# Patient Record
Sex: FEMALE | Race: WHITE | Employment: FULL TIME | URBAN - METROPOLITAN AREA
[De-identification: names, ages, dates, MRNs, and addresses within clinical notes are randomized per-mention and may not be internally consistent; named-entity substitution may affect disease eponyms.]

---

## 2020-09-23 ENCOUNTER — EMPLOYEE WELLNESS (OUTPATIENT)
Dept: FAMILY MEDICINE CLINIC | Age: 20
End: 2020-09-23

## 2020-09-23 LAB
CHOLEST SERPL-MCNC: 223 MG/DL
GLUCOSE SERPL-MCNC: 74 MG/DL (ref 65–100)
HDLC SERPL-MCNC: 55 MG/DL
LDLC SERPL CALC-MCNC: 125.8 MG/DL (ref 0–100)
TRIGL SERPL-MCNC: 211 MG/DL (ref ?–150)

## 2020-10-05 ENCOUNTER — VIRTUAL VISIT (OUTPATIENT)
Dept: SLEEP MEDICINE | Age: 20
End: 2020-10-05
Payer: COMMERCIAL

## 2020-10-05 DIAGNOSIS — G47.33 OBSTRUCTIVE SLEEP APNEA (ADULT) (PEDIATRIC): Primary | ICD-10-CM

## 2020-10-05 DIAGNOSIS — G47.26 SHIFT WORK SLEEP DISORDER: ICD-10-CM

## 2020-10-05 PROCEDURE — 99204 OFFICE O/P NEW MOD 45 MIN: CPT | Performed by: INTERNAL MEDICINE

## 2020-10-05 NOTE — PATIENT INSTRUCTIONS
[address] [place]. [phone] [facility], [address] Tel.  [phone] Fax. 100 [facility] 60 [place], [address] Tel.  [phone] Fax. [phone] [address] Fransisco Salazar Tel.  [phone] Fax. [phone] Sleep Apnea: After Your Visit Your Care Instructions Sleep apnea occurs when you frequently stop breathing for 10 seconds or longer during sleep. It can be mild to severe, based on the number of times per hour that you stop breathing or have slowed breathing. Blocked or narrowed airways in your nose, mouth, or throat can cause sleep apnea. Your airway can become blocked when your throat muscles and tongue relax during sleep. Sleep apnea is common, occurring in 1 out of 20 individuals. Individuals having any of the following characteristics should be evaluated and treated right away due to high risk and detrimental consequences from untreated sleep apnea: 
1. Obesity 2. Congestive Heart failure 3. Atrial Fibrillation 4. Uncontrolled Hypertension 5. Type II Diabetes 6. Night-time Arrhythmias 7. Stroke 8. Pulmonary Hypertension 9. High-risk Driving Populations (pilots, truck drivers, etc.) 10. Patients Considering Weight-loss Surgery How do you know you have sleep apnea? You probably have sleep apnea if you answer 'yes' to 3 or more of the following questions: S - Have you been told that you Snore? T - Are you often Tired during the day? O - Has anyone Observed you stop breathing while sleeping? P- Do you have (or are being treated for) high blood Pressure? B - Are you obese (Body Mass Index > 35)? A - Is your Age 48years old or older? N - Is your Neck size greater than 16 inches? G - Are you male Gender? A sleep physician can prescribe a breathing device that prevents tissues in the throat from blocking your airway.  Or your doctor may recommend using a dental device (oral breathing device) to help keep your airway open. In some cases, surgery may be needed to remove enlarged tissues in the throat. Follow-up care is a key part of your treatment and safety. Be sure to make and go to all appointments, and call your doctor if you are having problems. It's also a good idea to know your test results and keep a list of the medicines you take. How can you care for yourself at home? · Lose weight, if needed. It may reduce the number of times you stop breathing or have slowed breathing. · Go to bed at the same time every night. · Sleep on your side. It may stop mild apnea. If you tend to roll onto your back, sew a pocket in the back of your pajama top. Put a tennis ball into the pocket, and stitch the pocket shut. This will help keep you from sleeping on your back. · Avoid alcohol and medicines such as sleeping pills and sedatives before bed. · Do not smoke. Smoking can make sleep apnea worse. If you need help quitting, talk to your doctor about stop-smoking programs and medicines. These can increase your chances of quitting for good. · Prop up the head of your bed 4 to 6 inches by putting bricks under the legs of the bed. · Treat breathing problems, such as a stuffy nose, caused by a cold or allergies. · Use a continuous positive airway pressure (CPAP) breathing machine if lifestyle changes do not help your apnea and your doctor recommends it. The machine keeps your airway from closing when you sleep. · If CPAP does not help you, ask your doctor whether you should try other breathing machines. A bilevel positive airway pressure machine has two types of air pressureâone for breathing in and one for breathing out. Another device raises or lowers air pressure as needed while you breathe. · If your nose feels dry or bleeds when using one of these machines, talk with your doctor about increasing moisture in the air. A humidifier may help.  
· If your nose is runny or stuffy from using a breathing machine, talk with your doctor about using decongestants or a corticosteroid nasal spray. When should you call for help? Watch closely for changes in your health, and be sure to contact your doctor if: 
· You still have sleep apnea even though you have made lifestyle changes. · You are thinking of trying a device such as CPAP. · You are having problems using a CPAP or similar machine. Where can you learn more? Go to [facility]. Enter W302 in the search box to learn more about \"Sleep Apnea: After Your Visit. \"  
© 4810-5356 Healthwise, Incorporated. Care instructions adapted under license by [facility] (which disclaims liability or warranty for this information). This care instruction is for use with your licensed healthcare professional. If you have questions about a medical condition or this instruction, always ask your healthcare professional. Marilyn Holm any warranty or liability for your use of this information. PROPER SLEEP HYGIENE What to avoid · Do not have drinks with caffeine, such as coffee or black tea, for 8 hours before bed. · Do not smoke or use other types of tobacco near bedtime. Nicotine is a stimulant and can keep you awake. · Avoid drinking alcohol late in the evening, because it can cause you to wake in the middle of the night. · Do not eat a big meal close to bedtime. If you are hungry, eat a light snack. · Do not drink a lot of water close to bedtime, because the need to urinate may wake you up during the night. · Do not read or watch TV in bed. Use the bed only for sleeping and sexual activity. What to try · Go to bed at the same time every night, and wake up at the same time every morning. Do not take naps during the day. · Keep your bedroom quiet, dark, and cool. · Get regular exercise, but not within 3 to 4 hours of your bedtime. Pierre Hendrickson · Sleep on a comfortable pillow and mattress. · If watching the clock makes you anxious, turn it facing away from you so you cannot see the time. · If you worry when you lie down, start a worry book. Well before bedtime, write down your worries, and then set the book and your concerns aside. · Try meditation or other relaxation techniques before you go to bed. · If you cannot fall asleep, get up and go to another room until you feel sleepy. Do something relaxing. Repeat your bedtime routine before you go to bed again. · Make your house quiet and calm about an hour before bedtime. Turn down the lights, turn off the TV, log off the computer, and turn down the volume on music. This can help you relax after a busy day. Drowsy Driving The [place] [address] cites drowsiness as a causing factor in more than 160,394 police reported crashes annually, resulting in 76,000 injuries and 1,500 deaths. Other surveys suggest 55% of people polled have driven while drowsy in the past year, 23% had fallen asleep but not crashed, 3% crashed, and 2% had and accident due to drowsy driving. Who is at risk? Young Drivers: One study of drowsy driving accidents states that 55% of the drivers were under 25 years. Of those, 75% were male. Shift Workers and Travelers: People who work overnight or travel across time zones frequently are at higher risk of experiencing Circadian Rhythm Disorders. They are trying to work and function when their body is programed to sleep. Sleep Deprived: Lack of sleep has a serious impact on your ability to pay attention or focus on a task. Consistently getting less than the average of 8 hours your body needs creates partial or cumulative sleep deprivation. Untreated Sleep Disorders: Sleep Apnea, Narcolepsy, R.L.S., and other sleep disorders (untreated) prevent a person from getting enough restful sleep.  This leads to excessive daytime sleepiness and increases the risk for drowsy driving accidents by up to 7 times. Medications / Alcohol: Even over the counter medications can cause drowsiness. Medications that impair a drivers attention should have a warning label. Alcohol naturally makes you sleepy and on its own can cause accidents. Combined with excessive drowsiness its effects are amplified. Signs of Drowsy Driving: * You don't remember driving the last few miles * You may drift out of your divine * You are unable to focus and your thoughts wander * You may yawn more often than normal 
 * You have difficulty keeping your eyes open / nodding off * Missing traffic signs, speeding, or tailgating Prevention-  
Good sleep hygiene, lifestyle and behavioral choices have the most impact on drowsy driving. There is no substitute for sleep and the average person requires 8 hours nightly. If you find yourself driving drowsy, stop and sleep. Consider the sleep hygiene tips provided during your visit as well. Medication Refill Policy: Refills for all medications require 1 week advance notice. Please have your pharmacy fax a refill request. We are unable to fax, or call in \"controled substance\" medications and you will need to pick these prescriptions up from our office. [employer] Activation Thank you for requesting access to [employer]. Please follow the instructions below to securely access and download your online medical record. [employer]Evidence allows you to send messages to your doctor, view your test results, renew your prescriptions, schedule appointments, and more. How Do I Sign Up? 1. In your internet browser, go to [email or web address]. 2. Click on the First Time User? Click Here link in the Sign In box. You will see the New Member Sign Up page. 3. Enter your BigEvidence Access Code exactly as it appears below. You will not need to use this code after youve completed the sign-up process.  If you do not sign up before the expiration date, you must request a new code. [employer] Access Code: Activation code not generated Current [employer] Status: Active (This is the date your [employer] access code will ) 4. Enter the last four digits of your Social Security Number (xxxx) and Date of Birth (mm/dd/yyyy) as indicated and click Submit. You will be taken to the next sign-up page. 5. Create a Brevadot ID. This will be your [employer] login ID and cannot be changed, so think of one that is secure and easy to remember. 6. Create a [employer] password. You can change your password at any time. 7. Enter your Password Reset Question and Answer. This can be used at a later time if you forget your password. 8. Enter your e-mail address. You will receive e-mail notification when new information is available in [address] 9. Click Sign Up. You can now view and download portions of your medical record. 10. Click the Download Summary menu link to download a portable copy of your medical information. Additional Information If you have questions, please call [phone]. Remember, [employer] is NOT to be used for urgent needs. For medical emergencies, dial 911.

## 2020-10-05 NOTE — PROGRESS NOTES
[address] [facility] [place]. [place], [address]  Tel.  [phone]  Fax. 100 [facility] 60  [place], [address] [facility]  Tel.  [phone]  Fax. [phone] [address] [facility] Fransisco Najera  Tel.  [phone]  Fax. [phone]         Subjective:        Telemedicine visit performed with verbal consent of the patient. Patient called and identity confirmed with 2 patient identifers    Patient was seen at home  ISAAC Palma is a 23 y.o. female who was seen by synchronous (real-time) audio-video technology on 10/5/2020. Consent:  She and/or her healthcare decision maker is aware that this patient-initiated Telehealth encounter is the equivalent to a face to face encounter in the sleep disorder center and has provided verbal consent to proceed: Yes    I was at home while conducting this encounter. ISAAC Palma is an 23 y.o. female self-referred for evaluation for a sleep disorder. She complains of difficulty staying asleep and snoring associated with excessive daytime sleepiness, frustration with her poor sleep quality. Symptoms began several years ago, gradually worsening since that time. She usually can fall asleep in 90 minutes. Family or house members note snoring, periods of not breathing, kicking. She denies falling asleep while at work. Leah Fields does wake up frequently at night. She is bothered by waking up too early and left unable to get back to sleep. She actually sleeps about 5 hours at night and wakes up about 5 times during the night. She does work shifts: Third Shift; Other Shift. Leah indicates she does get too little sleep at night. Her bedtime is 1200. She awakens at 0500. She does take naps. She takes 4 naps a week lasting 4, 5.  She has the following observed behaviors: Loud snoring, Light snoring, Twitching of legs or feet, Pauses in breathing, Sleep talking, Kicking with legs, Becoming very rigid and/or shaking; Nightmares(waking with a gasp or snort). Other remarks:    She works 3 12 hour shifts at night (7a-7p). She says she tries to sleep at night on her non-working days. Her  also works nights. [place] Sleepiness Score: 10 which reflect mild daytime drowsiness. Allergies   Allergen Reactions    Pcn [Penicillins] Shortness of Breath and Rash         Current Outpatient Medications:     etonogestrel (NEXPLANON SDRM), by SubDERmal route., Disp: , Rfl:     lamoTRIgine (LAMICTAL) 200 mg tablet, Take 1 Tab by mouth daily. , Disp: 90 Tab, Rfl: 1    DULoxetine 40 mg cpDR, Take 40 mg by mouth daily. Indications: Anxiousness associated with Depression (Patient taking differently: Take 100 mg by mouth daily. Indications: anxiousness associated with depression), Disp: 90 Cap, Rfl: 1     She  has a past medical history of Bipolar 2 disorder ([address] Depression, HTN (hypertension), and PCOS (polycystic ovarian syndrome). She  has a past surgical history that includes hx adenoidectomy. She family history includes COPD in her paternal grandmother; Cancer in her maternal grandmother, mother, and paternal grandmother; Heart Attack in her paternal grandfather; Hypertension in her father; Other in her sister; Stroke in her maternal grandfather. She  reports that she has never smoked. She has quit using smokeless tobacco. She reports current alcohol use. Review of Systems:  Constitutional: + No significant weight loss or weight gain  Eyes:  No blurred vision  CVS:  No significant chest pain  Pulm:  No significant shortness of breath  GI:  No significant nausea or vomiting  :  No significant nocturia  Musculoskeletal:  No significant joint pain at night  Skin:  No significant rashes  Neuro:  No significant dizziness   Psych:  Treated for bipolar disease.  citalopram dosage increased about a month ago    Sleep Review of Systems: notable for + difficulty falling asleep; +frequent awakenings at night;  + dreaming noted; no nightmares ; no early morning headaches    Objective: There were no vitals taken for this visit. Vital Signs: (As obtained by patient/caregiver at home)        Constitutional: [x] Appears well-developed and well-nourished [x] No apparent distress      [] Abnormal -     Mental status: [x] Alert and awake  [x] Oriented to person/place/time [x] Able to follow commands    [] Abnormal -     Eyes:   EOM    [x]  Normal    [] Abnormal -   Sclera  [x]  Normal    [] Abnormal -          Discharge [x]  None visible   [] Abnormal -     HENT: [x] Normocephalic, atraumatic  [] Abnormal -   [x] Mouth/Throat: Mucous membranes are moist               Class 3 oropharynx, thick tongue base                  low soft palate,absent retrognathia    External Ears [x] Normal  [] Abnormal -    Neck: [x] No visualized mass [] Abnormal -     Pulmonary/Chest: [x] Respiratory effort normal   [x] No visualized signs of difficulty breathing or respiratory distress        [] Abnormal -       Neurological:        [x] No Facial Asymmetry (Cranial nerve 7 motor function) (limited exam due to video visit)          [x] No gaze palsy        [] Abnormal -          Skin:        [x] No significant exanthematous lesions or discoloration noted on facial skin         [] Abnormal -            Psychiatric:       [x] Normal Affect [] Abnormal -       Other pertinent observable physical exam findings:-          Assessment:       ICD-10-CM ICD-9-CM    1. Obstructive sleep apnea (adult) (pediatric)  G47.33 327.23 SLEEP STUDY UNATTENDED, 4 CHANNEL   2. Shift work sleep disorder  G47.26 327.36          Plan:       * Sleep testing was ordered for initial evaluation. * She was provided information on sleep apnea including coresponding risk factors and the importance of proper treatment. * Treatment options for sleep apnea were reviewed today. Patient agrees to a trial of PAP therapy if indicated.   * Counseling was provided regarding proper sleep hygiene, appropriate sleep schedule, need for sleep environment safety and safe driving. * Effect of sleep disturbance on weight was reviewed. We have recommended a dedicated weight loss through appropriate diet and an exercise regimen as significant weight reduction has been shown to reduce severity of obstructive sleep apnea. * Patient agrees to telephone follow-up by sleep technologist shortly after sleep study to review results and plan final management. 2.Circadian Rhythm Disorder/Shift work sleep disorder - she should, ideally maintain a regular sleep schedule on working and non-working days. Therefore, she should be sleeping at the same time 7 days a week. If unable to do so, then she should try to anchor at least 4 hours of sleep during the same sleep time as her working days. she  was advised to avoid looking at the clock during the sleep period. Ideally, the clock face should be turned away. she was advised to minimize caffeine use and to avoid caffeine-containing beverages 8 hours prior to bedtime. A regular exercise schedule, at least 3 hours before bedtime, would be beneficial to improving sleep quality. Watching TV, using laptops, tablets and smartphones prior to bedtime was discouraged. she  was advised to keep the bedroom cool and dark. The treatment plan was reviewed with the patient in detail and reviewed with the patient . she understands that the lead technologist will be calling her  with the results and assisting with the next step in the treatment plan as outlined today during the consultation with me. All of her questions were addressed.            Pursuant to the emergency declaration under the [facility], [facility] waiver authority and the [employer] and [employer] Act, this Virtual  Visit was conducted, with patient's consent, to reduce the patient's risk of exposure to COVID-19 and provide continuity of care for an established patient. Services were provided through a video synchronous discussion virtually to substitute for in-person clinic visit.     sAya Guardado MD    Electronically signed by    Ron Weems MD  Diplomate in Sleep Medicine  [facility]

## 2020-10-06 ENCOUNTER — DOCUMENTATION ONLY (OUTPATIENT)
Dept: SLEEP MEDICINE | Age: 20
End: 2020-10-06

## 2020-10-22 ENCOUNTER — HOSPITAL ENCOUNTER (OUTPATIENT)
Dept: SLEEP MEDICINE | Age: 20
Discharge: HOME | End: 2020-10-22
Attending: INTERNAL MEDICINE
Payer: COMMERCIAL

## 2020-10-22 PROCEDURE — 95806 SLEEP STUDY UNATT&RESP EFFT: CPT | Performed by: INTERNAL MEDICINE

## 2020-10-27 ENCOUNTER — DOCUMENTATION ONLY (OUTPATIENT)
Dept: SLEEP MEDICINE | Age: 20
End: 2020-10-27

## 2020-10-27 ENCOUNTER — TELEPHONE (OUTPATIENT)
Dept: SLEEP MEDICINE | Age: 20
End: 2020-10-27

## 2020-11-14 ENCOUNTER — PATIENT OUTREACH (OUTPATIENT)
Dept: OTHER | Age: 20
End: 2020-11-14

## 2020-11-14 NOTE — PROGRESS NOTES
2020, Per ED provider note:  Rain cabrera presents to ED after 2 negative COVID results and one swab pending because she has started having coughing \"fits\". .Clear CXR. Will D/C on steroids, Tessalon, F/U PCP. Reached associate at listed phone number, HIPAA verified. [facility] allowed this writer/ACM to explain purpose of outreach/ACM role. [facility] verbalizes understanding and agrees to CM/allow this ACM to assist. Agnes Ayon states her Department has been shut down, \"[employer] in [place] and has been in contact with HR, however, at this time COVID negative and unable to assist with further COVID related pay, plans to speak with immediate Manager on Monday to explore other employment options. Agnes Ayon agrees to allow ACM to notify our team , Bonny Plasencia to explore possible [employer] funds/applications. Agnes Ayon agrees to symptom monitoring program/provided email and agrees to keep this ACM contact information to call with further questions or concerns. Patient contacted regarding COVID-19 exposure. Discussed COVID-19 related testing which was Per ED Provider Note, 2 negative results[place] at this time. Test results were negative. Patient informed of results, if available? yes     Care Transition Nurse/ Ambulatory Care Manager contacted the patient by telephone to perform post discharge assessment. Verified name and  with patient as identifiers. Provided introduction to self, and explanation of the CTN/ACM role, and reason for call due to risk factors for infection and/or exposure to COVID-19. Symptoms reviewed with patient who verbalized the following symptoms: cough. Due to no new or worsening symptoms encounter was not routed to provider for escalation. Discussed follow-up appointments. If no appointment was previously scheduled, appointment scheduling offered: [facility] had virtual visit on  and agrees to call PCP as needed.   [facility] follow up appointment(s): No future appointments. Non-[facility] follow up appointment(s): N/A      Advance Care Planning:   Does patient have an Advance Directive: not on file     Patient has following risk factors of: Bronchitis. CTN/ACM reviewed discharge instructions, medical action plan and red flags such as increased shortness of breath, increasing fever and signs of decompensation with patient who verbalized understanding. Discussed exposure protocols and quarantine with CDC Guidelines What to do if you are sick with coronavirus disease 2019.  Patient was given an opportunity for questions and concerns. The patient agrees to contact the [facility] exposure line [phone], [facility]  ([phone]) and PCP office for questions related to their healthcare. CTN/ACM provided contact information for future needs. Reviewed and educated patient on any new and changed medications related to discharge diagnosis. Patient/family/caregiver given information for [employer] and agrees to enroll yes  Patient's preferred e-mail:  [email or web address]  Patient's preferred phone number: [phone]  Based on Loop alert triggers, patient will be contacted by nurse care manager for worsening symptoms. Plan for follow-up call in 3-5 days based on severity of symptoms and risk factors.

## 2020-11-14 NOTE — Clinical Note
Fabian Blackmon, I spoke to [facility] on Saturday. Are you are to reach out to assist with possible [employer] Fund/Application? Known COVID exposure presents to ED after 2 negative COVID results and one swab pending because she has started having coughing \"fits\". .Clear CXR. Will D/C on steroids, Tessalon, F/U PCP. Reached associate at listed phone number, HIPAA verified. [facility] allowed this writer/ACM to explain purpose of outreach/ACM role. [facility] verbalizes understanding and agrees to CM/allow this ACM to assist. [facility] states her Department has been shut down, \"[employer] in [place] and has been in contact with HR, however, at this time COVID negative and unable to assist with further COVID related pay, plans to speak with immediate Manager on Monday to explore other employment options. [facility] agrees to allow ACM to notify our team , Handy Patterson to explore possible [employer] funds/applications.   Thank you,  Adam Spencer

## 2020-11-16 ENCOUNTER — PATIENT OUTREACH (OUTPATIENT)
Dept: OTHER | Age: 20
End: 2020-11-16

## 2020-11-16 NOTE — PROGRESS NOTES
Yellow alert noted in remote symptom monitoring program. Messaged patient to notify Yousuf Engellupis if symptoms have worsened since yesterday or if they would like to have a nurse reach out. Hello, thank you for reaching out to us today. Please let me know if your symptoms are any worse or if you would like to speak with a nurse. Please feel free to call back to discuss symptoms at [phone], if needed. Thanks!

## 2020-11-17 ENCOUNTER — PATIENT OUTREACH (OUTPATIENT)
Dept: OTHER | Age: 20
End: 2020-11-17

## 2020-11-17 NOTE — PROGRESS NOTES
2020, Reached associate at listed phone number, HIPAA verified, Wallace Umaña states medication has helped/doing much better and has been cleared by Occupational Health, test results remain negative, however, her unit remains shut down at this time. Wallace Umaña agrees for this writer to reach out to our team , Cecelia Chew to discuss further options, as does not carry our insurance, she has [address]. She is classified as a full time employee, her last shift worked was a night shift, -, was sick  and that is day her unit was shut down(she had PTO for week of  -, nothing left for this week 11/15), and does not know when her unit will re-open. She states she has been cleared by Occupational Health to return to work She has been in contact with HR, her supervisor and other nursing supervisors to see if any shifts available, however, nothing available at this time. Patient contacted regarding COVID-19 risk and screening. Discussed COVID-19 related testing which was done at [facility], not seen by this writer/ACM at this time. Test results were negative. Patient informed of results, if available? Leah states both results were negative. Care Transition Nurse/ Ambulatory Care Manager contacted the patient by telephone to perform follow-up assessment. Verified name and  with patient as identifiers. Patient has following risk factors of: Bronchitis. Symptoms reviewed with patient who verbalized the following symptoms: no worsening symptoms. Due to no new or worsening symptoms encounter was not routed to provider for escalation. Education provided regarding infection prevention, and signs and symptoms of COVID-19 and when to seek medical attention with patient who verbalized understanding. Discussed exposure protocols and quarantine from [address] you at higher risk for severe illness  and given an opportunity for questions and concerns.  The patient agrees to contact the COVID-19 hotline [phone] or PCP office for questions related to their healthcare. CTN/ACM provided contact information for future reference. From CDC: Are you at higher risk for severe illness?  Wash your hands often.  Avoid close contact (6 feet, which is about two arm lengths) with people who are sick.  Put distance between yourself and other people if COVID-19 is spreading in your community.  Clean and disinfect frequently touched surfaces.  Avoid all cruise travel and non-essential air travel.  Call your healthcare professional if you have concerns about COVID-19 and your underlying condition or if you are sick. For more information on steps you can take to protect yourself, see [facility]'s How to [address] is currently enrolled in symptom management program.    Plan for follow-up call in 5-7 days based on severity of symptoms and risk factors. See Previous Documentation:  11/14/2020, Per ED provider note:  Zbigniew cabrera presents to ED after 2 negative COVID results and one swab pending because she has started having coughing \"fits\". .Clear CXR. Will D/C on steroids, Tessalon, F/U PCP. Reached associate at listed phone number, HIPAA verified. Leah allowed this writer/ACM to explain purpose of outreach/ACM role. Leah verbalizes understanding and agrees to CM/allow this ACM to assist. Ashley Davis states her Department has been shut down, [facility] in [place] and has been in contact with HR, however, at this time COVID negative and unable to assist with further COVID related pay, plans to speak with immediate Manager on Monday to explore other employment options. Ashley Davis agrees to allow ACM to notify our team , Diana Ordaz to explore possible [employer] funds/applications. Ashley Davis agrees to symptom monitoring program/provided email and agrees to keep this ACM contact information to call with further questions or concerns. Patient contacted regarding COVID-19 exposure. Discussed COVID-19 related testing which was Per ED Provider Note, 2 negative results([facility] testing done at [facility]) at this time. Test results were negative. Patient informed of results, if available? yes      Care Transition Nurse/ Ambulatory Care Manager contacted the patient by telephone to perform post discharge assessment. Verified name and  with patient as identifiers. Provided introduction to self, and explanation of the CTN/ACM role, and reason for call due to risk factors for infection and/or exposure to COVID-19.      Symptoms reviewed with patient who verbalized the following symptoms: cough. Due to no new or worsening symptoms encounter was not routed to provider for escalation. Discussed follow-up appointments. If no appointment was previously scheduled, appointment scheduling offered: [facility] had virtual visit on  and agrees to call PCP as needed. [facility] follow up appointment(s): No future appointments. Non-[facility] follow up appointment(s): N/A                 Advance Care Planning:   Does patient have an Advance Directive: not on file      Patient has following risk factors of: Bronchitis. CTN/ACM reviewed discharge instructions, medical action plan and red flags such as increased shortness of breath, increasing fever and signs of decompensation with patient who verbalized understanding. Discussed exposure protocols and quarantine with CDC Guidelines What to do if you are sick with coronavirus disease .  Patient was given an opportunity for questions and concerns. The patient agrees to contact the [facility] exposure line [phone], local [facility] department [address]  ([phone]) and PCP office for questions related to their healthcare.  CTN/ACM provided contact information for future needs.     Reviewed and educated patient on any new and changed medications related to discharge diagnosis.     Patient/family/caregiver given information for [employer] and agrees to enroll yes  Patient's preferred e-mail:  [email or web address]  Patient's preferred phone number: [phone]  Based on Loop alert triggers, patient will be contacted by nurse care manager for worsening symptoms.     Plan for follow-up call in 3-5 days based on severity of symptoms and risk factors.

## 2020-11-24 ENCOUNTER — PATIENT OUTREACH (OUTPATIENT)
Dept: OTHER | Age: 20
End: 2020-11-24

## 2020-11-24 NOTE — PROGRESS NOTES
HERIBERTO PENDLETON contacted patient. [facility], due to to financial challenges. Patient zip code and  obtained. Discussion  There was an initial concern that patient would be unable to work because her department shut down. Ms. Piper Cali stated that her department has since reopened. She obtained adequate hours last week to afford her upcoming expenses. Ms. Piper Cali added that she has full time hours on the . Follow up  No additional intervention needed at this time.

## 2021-02-09 ENCOUNTER — TRANSCRIBE ORDER (OUTPATIENT)
Dept: SCHEDULING | Age: 21
End: 2021-02-09

## 2021-02-09 DIAGNOSIS — R06.00 DYSPNEA: Primary | ICD-10-CM

## 2021-04-12 RX ORDER — ACETAMINOPHEN 500 MG
2000 TABLET ORAL DAILY
Qty: 90 CAP | Refills: 1 | Status: SHIPPED | OUTPATIENT
Start: 2021-04-12

## 2022-01-01 NOTE — TELEPHONE ENCOUNTER
Results of sleep study in [place]  Lead tech to convey results to patient  Results of HSAT in [place]    The home sleep apnea test showed AHI - 0.5.hour. THe lowest oxygen saturation was 86%. This correlates with a test that is negative for significant sleep apnea. We had discussed treatment plan at initial consultation. Based on the results of the home sleep apnea test, APAP is not indicated at this time. She should continue to work on regulating her sleep schedule - as a review from our visit- she should, ideally maintain a regular sleep schedule on working and non-working days. Therefore, she should be sleeping at the same time 7 days a week. If unable to do so, then she should try to anchor at least 4 hours of sleep during the same sleep time as her working days. she  was advised to avoid looking at the clock during the sleep period. Ideally, the clock face should be turned away. she was advised to minimize caffeine use and to avoid caffeine-containing beverages 8 hours prior to bedtime. A regular exercise schedule, at least 3 hours before bedtime, would be beneficial to improving sleep quality. Watching TV, using laptops, tablets and smartphones prior to bedtime was discouraged. she  was advised to keep the bedroom cool and dark. Repeat HSAT is indicated if symptoms worsen. 0.21

## 2022-04-20 ENCOUNTER — OFFICE VISIT (OUTPATIENT)
Dept: FAMILY MEDICINE CLINIC | Age: 22
End: 2022-04-20
Payer: COMMERCIAL

## 2022-04-20 VITALS
OXYGEN SATURATION: 99 % | RESPIRATION RATE: 16 BRPM | DIASTOLIC BLOOD PRESSURE: 80 MMHG | TEMPERATURE: 97.3 F | WEIGHT: 212 LBS | SYSTOLIC BLOOD PRESSURE: 110 MMHG | BODY MASS INDEX: 35.32 KG/M2 | HEIGHT: 65 IN | HEART RATE: 71 BPM

## 2022-04-20 DIAGNOSIS — Z11.59 ENCOUNTER FOR HEPATITIS C SCREENING TEST FOR LOW RISK PATIENT: ICD-10-CM

## 2022-04-20 DIAGNOSIS — Z87.42 HISTORY OF PCOS: ICD-10-CM

## 2022-04-20 DIAGNOSIS — E55.9 VITAMIN D DEFICIENCY: ICD-10-CM

## 2022-04-20 DIAGNOSIS — Z00.00 WELL ADULT EXAM: Primary | ICD-10-CM

## 2022-04-20 DIAGNOSIS — Z23 ENCOUNTER FOR IMMUNIZATION: ICD-10-CM

## 2022-04-20 DIAGNOSIS — Z13.1 SCREENING FOR DIABETES MELLITUS: ICD-10-CM

## 2022-04-20 DIAGNOSIS — F31.81 BIPOLAR 2 DISORDER (HCC): ICD-10-CM

## 2022-04-20 PROCEDURE — 99395 PREV VISIT EST AGE 18-39: CPT | Performed by: NURSE PRACTITIONER

## 2022-04-20 PROCEDURE — 90651 9VHPV VACCINE 2/3 DOSE IM: CPT

## 2022-04-20 PROCEDURE — 90471 IMMUNIZATION ADMIN: CPT | Performed by: NURSE PRACTITIONER

## 2022-04-20 RX ORDER — VENLAFAXINE HYDROCHLORIDE 75 MG/1
75 CAPSULE, EXTENDED RELEASE ORAL
COMMUNITY
Start: 2022-01-31 | End: 2022-09-06 | Stop reason: SDUPTHER

## 2022-04-20 RX ORDER — LAMOTRIGINE 100 MG/1
100 TABLET ORAL 2 TIMES DAILY
COMMUNITY
Start: 2022-04-04 | End: 2022-09-06 | Stop reason: SDUPTHER

## 2022-04-20 RX ORDER — CALCIUM CARBONATE 300MG(750)
TABLET,CHEWABLE ORAL
COMMUNITY
Start: 2022-02-03

## 2022-04-20 RX ORDER — BUSPIRONE HYDROCHLORIDE 7.5 MG/1
7.5 TABLET ORAL 2 TIMES DAILY
COMMUNITY
Start: 2022-04-04 | End: 2022-09-06 | Stop reason: SDUPTHER

## 2022-04-20 NOTE — PROGRESS NOTES
Vaccine updated. Gardisil vaccine shot given Lot #[id number] Exp 04-  Left deltoid  Denies former reactions to shot and any allergies to chicken eggs or products. 1. \"Have you been to the ER, urgent care clinic since your last visit? Hospitalized since your last visit? \" No    2. \"Have you seen or consulted any other health care providers outside of the [address] [place] [address] since your last visit? \"  Yes [facility] 3-2022 vertigo     3. For patients aged 39-70: Has the patient had a colonoscopy / FIT/ Cologuard? NA      If the patient is female:    4. For patients aged 41-77: Has the patient had a mammogram within the past 2 years? NA - based on age or sex      11. For patients aged 21-65: Has the patient had a pap smear? Yes - [place] present.  Rooming MA/LPN to request most recent results

## 2022-04-21 LAB
25(OH)D3 SERPL-MCNC: 13.9 NG/ML (ref 30–100)
ALBUMIN SERPL-MCNC: 4.6 G/DL (ref 3.5–5)
ALBUMIN/GLOB SERPL: 1.4 {RATIO} (ref 1.1–2.2)
ALP SERPL-CCNC: 88 U/L (ref 45–117)
ALT SERPL-CCNC: 32 U/L (ref 12–78)
ANION GAP SERPL CALC-SCNC: 3 MMOL/L (ref 5–15)
AST SERPL-CCNC: 14 U/L (ref 15–37)
BASOPHILS # BLD: 0.1 K/UL (ref 0–0.1)
BASOPHILS NFR BLD: 1 % (ref 0–1)
BILIRUB SERPL-MCNC: 0.3 MG/DL (ref 0.2–1)
BUN SERPL-MCNC: 10 MG/DL (ref 6–20)
BUN/CREAT SERPL: 14 (ref 12–20)
CALCIUM SERPL-MCNC: 9.5 MG/DL (ref 8.5–10.1)
CHLORIDE SERPL-SCNC: 106 MMOL/L (ref 97–108)
CHOLEST SERPL-MCNC: 249 MG/DL
CO2 SERPL-SCNC: 28 MMOL/L (ref 21–32)
CREAT SERPL-MCNC: 0.71 MG/DL (ref 0.55–1.02)
DIFFERENTIAL METHOD BLD: ABNORMAL
EOSINOPHIL # BLD: 0.1 K/UL (ref 0–0.4)
EOSINOPHIL NFR BLD: 2 % (ref 0–7)
ERYTHROCYTE [DISTWIDTH] IN BLOOD BY AUTOMATED COUNT: 13.1 % (ref 11.5–14.5)
EST. AVERAGE GLUCOSE BLD GHB EST-MCNC: 105 MG/DL
GLOBULIN SER CALC-MCNC: 3.3 G/DL (ref 2–4)
GLUCOSE SERPL-MCNC: 88 MG/DL (ref 65–100)
HBA1C MFR BLD: 5.3 % (ref 4–5.6)
HCT VFR BLD AUTO: 46.7 % (ref 35–47)
HCV AB SERPL QL IA: NONREACTIVE
HDLC SERPL-MCNC: 64 MG/DL
HDLC SERPL: 3.9 {RATIO} (ref 0–5)
HGB BLD-MCNC: 15.1 G/DL (ref 11.5–16)
IMM GRANULOCYTES # BLD AUTO: 0 K/UL (ref 0–0.04)
IMM GRANULOCYTES NFR BLD AUTO: 0 % (ref 0–0.5)
LDLC SERPL CALC-MCNC: 158.8 MG/DL (ref 0–100)
LYMPHOCYTES # BLD: 2.4 K/UL (ref 0.8–3.5)
LYMPHOCYTES NFR BLD: 36 % (ref 12–49)
MCH RBC QN AUTO: 28.4 PG (ref 26–34)
MCHC RBC AUTO-ENTMCNC: 32.3 G/DL (ref 30–36.5)
MCV RBC AUTO: 87.8 FL (ref 80–99)
MONOCYTES # BLD: 0.4 K/UL (ref 0–1)
MONOCYTES NFR BLD: 6 % (ref 5–13)
NEUTS SEG # BLD: 3.6 K/UL (ref 1.8–8)
NEUTS SEG NFR BLD: 55 % (ref 32–75)
NRBC # BLD: 0 K/UL (ref 0–0.01)
NRBC BLD-RTO: 0 PER 100 WBC
PLATELET # BLD AUTO: 316 K/UL (ref 150–400)
PMV BLD AUTO: 10.2 FL (ref 8.9–12.9)
POTASSIUM SERPL-SCNC: 4.4 MMOL/L (ref 3.5–5.1)
PROLACTIN SERPL-MCNC: 7.5 NG/ML
PROT SERPL-MCNC: 7.9 G/DL (ref 6.4–8.2)
RBC # BLD AUTO: 5.32 M/UL (ref 3.8–5.2)
SODIUM SERPL-SCNC: 137 MMOL/L (ref 136–145)
TRIGL SERPL-MCNC: 131 MG/DL (ref ?–150)
TSH SERPL DL<=0.05 MIU/L-ACNC: 1.87 UIU/ML (ref 0.36–3.74)
VLDLC SERPL CALC-MCNC: 26.2 MG/DL
WBC # BLD AUTO: 6.6 K/UL (ref 3.6–11)

## 2022-04-21 NOTE — PROGRESS NOTES
Subjective:     Elvis Holloway is a 23 y.o. female who presents today with the following:  Chief Complaint   Patient presents with    PCOS       Patient Active Problem List   Diagnosis Code    [facility] F31.81         COMPLIANT WITH MEDICATION:   HTN; Denies chest pain, dyspnea, palpitations, headache and blurred vision. Blood pressure normotensive. depression screening addressed not at risk    abuse screening addressed denies    learning assessment addressed reviewed nurses notes    fall risk addressed not at risk    HM: addressed    ROS:  Gen: denies fever, chills, fatigue, weight loss, weight gain  HEENT:denies blurry vision, nasal congestion, sore throat  Resp: denies dypsnea, cough, wheezing  CV: denies chest pain radiating to the jaws or arms, palpitations, lower extremity edema  Abd: denies nausea, vomiting, diarrhea, constipation  Neuro: denies numbness/tingling  Endo: denies polyuria, polydipsia, heat/cold intolerance  Heme: no lymphadenopathy    Allergies   Allergen Reactions    Pcn [Penicillins] Shortness of Breath and Rash         Current Outpatient Medications:     lamoTRIgine (LaMICtal) 100 mg tablet, Take 100 mg by mouth two (2) times a day., Disp: , Rfl:     busPIRone (BUSPAR) 7.5 mg tablet, Take 7.5 mg by mouth two (2) times a day., Disp: , Rfl:     melatonin 10 mg TbDi, DISSOLVE 1 TABLET BY MOUTH AT BEDTIME, Disp: , Rfl:     venlafaxine-SR (EFFEXOR-XR) 75 mg capsule, Take 75 mg by mouth., Disp: , Rfl:     cholecalciferol (VITAMIN D3) (2,000 UNITS /50 MCG) cap capsule, Take 1 Cap by mouth daily. , Disp: 90 Cap, Rfl: 1    sertraline (ZOLOFT) 100 mg tablet, Take  by mouth daily. (Patient not taking: Reported on 4/20/2022), Disp: , Rfl:     benzonatate (Tessalon Perles) 100 mg capsule, Take 1 Cap by mouth three (3) times daily as needed for Cough. (Patient not taking: Reported on 4/20/2022), Disp: 15 Cap, Rfl: 0    etonogestrel (Thermon Shivers), by SubDERmal route.  Not a current medication (Patient not taking: Reported on 4/20/2022), Disp: , Rfl:     lamoTRIgine (LAMICTAL) 200 mg tablet, Take 1 Tab by mouth daily. (Patient not taking: Reported on 4/20/2022), Disp: 80 Tab, Rfl: 1    Past Medical History:   Diagnosis Date    Bipolar 2 disorder ([facility]     Depression     HTN (hypertension)     PCOS (polycystic ovarian syndrome)        Past Surgical History:   Procedure Laterality Date    HX ADENOIDECTOMY         Social History     Tobacco Use   Smoking Status Never Smoker   Smokeless Tobacco Former User       Social History     Socioeconomic History    Marital status:    Tobacco Use    Smoking status: Never Smoker    Smokeless tobacco: Former User   Vaping Use    Vaping Use: Never used   Substance and Sexual Activity    Alcohol use: Yes     Comment: beer, wine 1 a week    Drug use: Never       Family History   Problem Relation Age of Onset   Jo Pro Cancer Mother         breast    Hypertension Father     Other Sister         PCOS    COPD Paternal Grandmother     Cancer Paternal Grandmother         breast    Cancer Maternal Grandmother     Stroke Maternal Grandfather         three    Heart Attack Paternal Grandfather          Objective:     Visit Vitals  /80 (BP 1 Location: Right upper arm, BP Patient Position: Sitting, BP Cuff Size: Large adult)   Pulse 71   Temp 97.3 °F (36.3 °C) (Temporal)   Resp 16   Ht 5' 5\" (1.651 m)   Wt 212 lb (96.2 kg)   SpO2 99%   BMI 35.28 kg/m²     Body mass index is 35.28 kg/m². General: Alert and oriented. No acute distress. Well nourished  HEENT :  Ears:TMs are normal. Canals are clear. Eyes: pupils equal, round, react to light and accommodation. Extra ocular movements intact. Nose: patent. Mouth and throat is clear. Neck:supple full range of motion no thyromegaly. Trachea midline, No carotid bruits. No significant lymphadenopathy  Lungs[de-identified] clear to auscultation without wheezes, rales, or rhonchi.   Heart :RRR, S1 & S2 are normal intensity. No murmur; no gallop  Abdomen: bowel sounds active. No tenderness, guarding, rebound, masses, hepatic or spleen enlargement  Back: no CVA tenderness. Extremities: without clubbing, cyanosis, or edema  Pulses: radial and femoral pulses are normal  Neuro: HMF intact. Cranial nerves II through XII grossly normal.  Motor: is 5 over 5 and symmetrical.   Deep tendon reflexes: +2 equal  Psych:appropriate behavior, mood, affect and judgement. Results for orders placed or performed in visit on 09/23/20   BE WELL HEALTH SCREEN   Result Value Ref Range    Glucose 74 65 - 100 mg/dL    Cholesterol, total 223 (H) <200 MG/DL    HDL Cholesterol 55 MG/DL    LDL, calculated 125.8 (H) 0 - 100 MG/DL    Triglyceride 211 (H) <150 MG/DL       No results found for this visit on 04/20/22. Assessment/ Plan:     1. Bipolar 2 disorder ([facility]  Followed by specialist psychiatrist. Stable      2. Vitamin D deficiency  Continue vitamin D 2000 ul supplement   - VITAMIN D, 25 HYDROXY; Future  - VITAMIN D, 25 HYDROXY    3. Encounter for hepatitis C screening test for low risk patient  - COLLECTION VENOUS BLOOD,VENIPUNCTURE; Future  - HEPATITIS C AB; Future  - HEPATITIS C AB  - COLLECTION VENOUS BLOOD,VENIPUNCTURE    4. History of PCOS    - PROLACTIN; Future  - TSH 3RD GENERATION; Future  - 17-HYDROXYPREGNENOLONE; Future  - 17-HYDROXYPREGNENOLONE  - TSH 3RD GENERATION  - PROLACTIN    5. Well adult exam  - HBV CORE AB, IGG/IGM; Future  - COLLECTION VENOUS BLOOD,VENIPUNCTURE; Future  - HEPATITIS C AB; Future  - HEPATITIS C AB  - COLLECTION VENOUS BLOOD,VENIPUNCTURE  - HBV CORE AB, IGG/IGM    6. Screening for diabetes mellitus  - COLLECTION VENOUS BLOOD,VENIPUNCTURE; Future  - HEMOGLOBIN A1C WITH EAG; Future  - HEMOGLOBIN A1C WITH EAG  - COLLECTION VENOUS BLOOD,VENIPUNCTURE    7. BMI 35.0-35.9,adult  Discussed the patient's BMI with her.   The BMI follow up plan is as follows:     dietary management education, guidance, and counseling  encourage exercise  monitor weight  prescribed dietary intake    An After Visit Summary was printed and given to the patient.  - CBC WITH AUTOMATED DIFF; Future  - LIPID PANEL; Future  - METABOLIC PANEL, COMPREHENSIVE; Future  - METABOLIC PANEL, COMPREHENSIVE  - LIPID PANEL  - CBC WITH AUTOMATED DIFF    8.  Encounter for immunization    - HUMAN PAPILLOMA VIRUS (HPV) VACCINE, TYPES 6, 11, 16, 18 (QUADRIVALENT), 3 DOSE SCHED., IM      Orders Placed This Encounter    COLLECTION VENOUS BLOOD,VENIPUNCTURE     Standing Status:   Future     Number of Occurrences:   1     Standing Expiration Date:   5/20/2022    HUMAN PAPILLOMA VIRUS (HPV) VACCINE, TYPES 6, 11, 16, 18 (QUADRIVALENT), 3 DOSE SCHED., IM    HBV CORE AB, IGG/IGM     Standing Status:   Future     Number of Occurrences:   1     Standing Expiration Date:   4/20/2023    HEPATITIS C AB     Standing Status:   Future     Number of Occurrences:   1     Standing Expiration Date:   5/20/2022    CBC WITH AUTOMATED DIFF     Standing Status:   Future     Number of Occurrences:   1     Standing Expiration Date:   5/20/2022    LIPID PANEL     Standing Status:   Future     Number of Occurrences:   1     Standing Expiration Date:   8/88/9397    METABOLIC PANEL, COMPREHENSIVE     Standing Status:   Future     Number of Occurrences:   1     Standing Expiration Date:   5/20/2022    HEMOGLOBIN A1C WITH EAG     Standing Status:   Future     Number of Occurrences:   1     Standing Expiration Date:   5/20/2022    VITAMIN D, 25 HYDROXY     Standing Status:   Future     Number of Occurrences:   1     Standing Expiration Date:   4/20/2023    PROLACTIN     Standing Status:   Future     Number of Occurrences:   1     Standing Expiration Date:   4/20/2023    TSH 3RD GENERATION     Standing Status:   Future     Number of Occurrences:   1     Standing Expiration Date:   4/20/2023    17-HYDROXYPREGNENOLONE     Standing Status:   Future     Number of Occurrences:   1 Standing Expiration Date:   4/20/2023    lamoTRIgine (LaMICtal) 100 mg tablet     Sig: Take 100 mg by mouth two (2) times a day.  busPIRone (BUSPAR) 7.5 mg tablet     Sig: Take 7.5 mg by mouth two (2) times a day.  melatonin 10 mg TbDi     Sig: DISSOLVE 1 TABLET BY MOUTH AT BEDTIME    venlafaxine-SR (EFFEXOR-XR) 75 mg capsule     Sig: Take 75 mg by mouth. Verbal and written instructions (see AVS) provided. Patient expresses understanding of diagnosis and treatment plan.     Health Maintenance Due   Topic Date Due    Hepatitis C Screening  Never done    DTaP/Tdap/Td series (1 - Tdap) Never done    Pap Smear  Never done               KRYSTAL Venegas-C

## 2022-04-22 LAB
COMMENT, HOLDF: NORMAL
HBV CORE AB SERPL QL IA: NEGATIVE
HBV CORE IGM SERPL QL IA: NEGATIVE
SAMPLES BEING HELD,HOLD: NORMAL

## 2022-04-22 NOTE — ACP (ADVANCE CARE PLANNING)
Has advanced medical directive scanned into chart. Reviewed at this visit. No changes.  Emmanuel HARDYC

## 2022-04-26 LAB — 17OH-PREG SERPL-MCNC: 42 NG/DL

## 2022-04-28 NOTE — PROGRESS NOTES
17 hydroxypregnenolone is low  points to a possible  adrenal insuffiencey,PCOS.   Highly recommend establishing with an endocrinologist.

## 2022-05-10 ENCOUNTER — VIRTUAL VISIT (OUTPATIENT)
Dept: FAMILY MEDICINE CLINIC | Age: 22
End: 2022-05-10
Payer: COMMERCIAL

## 2022-05-10 DIAGNOSIS — J40 BRONCHITIS: Primary | ICD-10-CM

## 2022-05-10 PROCEDURE — 99213 OFFICE O/P EST LOW 20 MIN: CPT | Performed by: NURSE PRACTITIONER

## 2022-05-10 RX ORDER — AZITHROMYCIN 250 MG/1
TABLET, FILM COATED ORAL
Qty: 6 TABLET | Refills: 0 | Status: SHIPPED | OUTPATIENT
Start: 2022-05-10

## 2022-05-10 RX ORDER — BENZONATATE 100 MG/1
100 CAPSULE ORAL
Qty: 30 CAPSULE | Refills: 0 | Status: SHIPPED | OUTPATIENT
Start: 2022-05-10

## 2022-05-10 NOTE — PROGRESS NOTES
Chief Complaint   Patient presents with    Cough     . 1. \"Have you been to the ER, urgent care clinic since your last visit? Hospitalized since your last visit? \" Yes yes urgent care on Saturday . Pneumonia was ruled out. 2. \"Have you seen or consulted any other health care providers outside of the [address] [place] [address] since your last visit? \"  Yes urgent care Max Comment Saturday for cough    3. For patients aged 39-70: Has the patient had a colonoscopy / FIT/ Cologuard?  no      If the patient is female:    4. For patients aged 41-77: Has the patient had a mammogram within the past 2 years?  no      5. For patients aged 21-65: Has the patient had a pap smear? No  Abuse Screening Questionnaire 5/10/2022   Do you ever feel afraid of your partner? N   Are you in a relationship with someone who physically or mentally threatens you? N   Is it safe for you to go home?  Y     3 most recent PHQ Screens 5/10/2022   Little interest or pleasure in doing things Not at all   Feeling down, depressed, irritable, or hopeless Not at all   Total Score PHQ 2 0   Trouble falling or staying asleep, or sleeping too much -   Feeling tired or having little energy -   Poor appetite, weight loss, or overeating -   Feeling bad about yourself - or that you are a failure or have let yourself or your family down -   Trouble concentrating on things such as school, work, reading, or watching TV -   Moving or speaking so slowly that other people could have noticed; or the opposite being so fidgety that others notice -   Thoughts of being better off dead, or hurting yourself in some way -   PHQ 9 Score -   How difficult have these problems made it for you to do your work, take care of your home and get along with others -

## 2022-05-10 NOTE — PROGRESS NOTES
Devang Moreno is a 23 y.o. female who was seen by synchronous (real-time) audio-video technology on 5/10/2022 for Cough    Cough and chest congestion x 9 days. She endorses going to urgent care on Saturday pneumonia was ruled out. She endorses taking a z pack in the past with no ill effects . Assessment & Plan:   Diagnoses and all orders for this visit:    1. Bronchitis  -     azithromycin (ZITHROMAX) 250 mg tablet; Take 2 tablets today, then take 1 tablet daily  -     benzonatate (Tessalon Perles) 100 mg capsule; Take 1 Capsule by mouth three (3) times daily as needed for Cough. The complexity of medical decision making for this visit is moderate         I spent at least 20 minutes on this visit with this established patient. Subjective:       Prior to Admission medications    Medication Sig Start Date End Date Taking? Authorizing Provider   lamoTRIgine (LaMICtal) 100 mg tablet Take 100 mg by mouth two (2) times a day. 4/4/22  Yes Provider, Historical   busPIRone (BUSPAR) 7.5 mg tablet Take 7.5 mg by mouth two (2) times a day. 4/4/22  Yes Provider, Historical   melatonin 10 mg TbDi DISSOLVE 1 TABLET BY MOUTH AT BEDTIME 2/3/22  Yes Provider, Historical   venlafaxine-SR (EFFEXOR-XR) 75 mg capsule Take 75 mg by mouth. 1/31/22  Yes Provider, Historical   cholecalciferol (VITAMIN D3) (2,000 UNITS /50 MCG) cap capsule Take 1 Cap by mouth daily. 4/12/21  Yes Farrel Purple, NP   sertraline (ZOLOFT) 100 mg tablet Take  by mouth daily. Patient not taking: Reported on 4/20/2022    Provider, Historical   benzonatate (Tessalon Perles) 100 mg capsule Take 1 Cap by mouth three (3) times daily as needed for Cough. Patient not taking: Reported on 4/20/2022 11/13/20   Victoria Friedman MD   etonogestrel (Michael Sheehan) by SubDERmal route. Not a current medication  Patient not taking: Reported on 4/20/2022    Provider, Historical   lamoTRIgine (LAMICTAL) 200 mg tablet Take 1 Tab by mouth daily.   Patient not taking: Reported on 4/20/2022 10/16/19   Jacquelin Cota NP     Patient Active Problem List   Diagnosis Code    Bipolar 2 disorder [facility]) F31.81     Patient Active Problem List    Diagnosis Date Noted    Bipolar 2 disorder ([facility] 04/20/2022     Current Outpatient Medications   Medication Sig Dispense Refill    azithromycin (ZITHROMAX) 250 mg tablet Take 2 tablets today, then take 1 tablet daily 6 Tablet 0    benzonatate (Tessalon Perles) 100 mg capsule Take 1 Capsule by mouth three (3) times daily as needed for Cough. 30 Capsule 0    lamoTRIgine (LaMICtal) 100 mg tablet Take 100 mg by mouth two (2) times a day.  busPIRone (BUSPAR) 7.5 mg tablet Take 7.5 mg by mouth two (2) times a day.  melatonin 10 mg TbDi DISSOLVE 1 TABLET BY MOUTH AT BEDTIME      venlafaxine-SR (EFFEXOR-XR) 75 mg capsule Take 75 mg by mouth.  cholecalciferol (VITAMIN D3) (2,000 UNITS /50 MCG) cap capsule Take 1 Cap by mouth daily. 90 Cap 1    sertraline (ZOLOFT) 100 mg tablet Take  by mouth daily. (Patient not taking: Reported on 4/20/2022)      etonogestrel (Nolon Stable) by SubDERmal route. Not a current medication (Patient not taking: Reported on 4/20/2022)      lamoTRIgine (LAMICTAL) 200 mg tablet Take 1 Tab by mouth daily.  (Patient not taking: Reported on 4/20/2022) 90 Tab 1     Allergies   Allergen Reactions    Pcn [Penicillins] Shortness of Breath and Rash     Past Medical History:   Diagnosis Date    Bipolar 2 disorder ([facility]     Depression     HTN (hypertension)     PCOS (polycystic ovarian syndrome)      Past Surgical History:   Procedure Laterality Date    HX ADENOIDECTOMY       Family History   Problem Relation Age of Onset    Cancer Mother         breast    Hypertension Father     Other Sister         PCOS    COPD Paternal Grandmother     Cancer Paternal Grandmother         breast    Cancer Maternal Grandmother     Stroke Maternal Grandfather         three    Heart Attack Paternal Grandfather Social History     Tobacco Use    Smoking status: Never Smoker    Smokeless tobacco: Former User   Substance Use Topics    Alcohol use: Yes     Comment: beer, wine 1 a week       ROS  Pertinent items are noted on the HPI  Objective:     Patient-Reported Vitals 5/10/2022   Patient-Reported Weight -   Patient-Reported Pulse -   Patient-Reported Temperature reports fever   Patient-Reported SpO2 -   Patient-Reported Systolic  -   Patient-Reported Diastolic -   Patient-Reported LMP no periods            Constitutional: [x] Appears well-developed and well-nourished [x] No apparent distress      [] Abnormal -   Ill appearing coughing frequently during the visit.    Mental status: [x] Alert and awake  [x] Oriented to person/place/time [x] Able to follow commands    [] Abnormal -     Eyes:   EOM    [x]  Normal    [] Abnormal -   Sclera  [x]  Normal    [] Abnormal -          Discharge [x]  None visible   [] Abnormal -     HENT: [x] Normocephalic, atraumatic  [] Abnormal -   [x] Mouth/Throat: Mucous membranes are moist    External Ears [x] Normal  [] Abnormal -    Neck: [x] No visualized mass [] Abnormal -     Pulmonary/Chest: [x] Respiratory effort normal   [x] No visualized signs of difficulty breathing or respiratory distress frequent coughing spells         [] Abnormal -      Musculoskeletal:   [x] Normal gait with no signs of ataxia         [x] Normal range of motion of neck        [] Abnormal -     Neurological:        [x] No Facial Asymmetry (Cranial nerve 7 motor function) (limited exam due to video visit)          [x] No gaze palsy        [] Abnormal -          Skin:        [x] No significant exanthematous lesions or discoloration noted on facial skin         [] Abnormal -            Psychiatric:       [x] Normal Affect [] Abnormal -        [x] No Hallucinations    Other pertinent observable physical exam findings:-        We discussed the expected course, resolution and complications of the diagnosis(es) in detail. Medication risks, benefits, costs, interactions, and alternatives were discussed as indicated. I advised her to contact the office if her condition worsens, changes or fails to improve as anticipated. She expressed understanding with the diagnosis(es) and plan. Cely Machuca, was evaluated through a synchronous (real-time) audio-video encounter. The patient (or guardian if applicable) is aware that this is a billable service, which includes applicable co-pays. Verbal consent to proceed has been obtained. The visit was conducted pursuant to the emergency declaration under the [facility], [address] [place] [address] authority and the [employer] General Act. Patient identification was verified, and a caregiver was present when appropriate. The patient was located at home in a state where the provider was licensed to provide care. Follow up if symptoms persist or worsen.      Andrés Combs NP

## 2022-09-02 ENCOUNTER — TELEPHONE (OUTPATIENT)
Dept: FAMILY MEDICINE CLINIC | Age: 22
End: 2022-09-02

## 2022-09-02 NOTE — TELEPHONE ENCOUNTER
[address] is requesting refills on buspirone 7.5 mg, lamotrigine 100 mg, and venlafaxine 75 mg. She is asking for 90 day supply due to being in between psychiatrist. The one she was seeing left suddenly. She doesn't want to run out before she can find another one. She uses [facility].

## 2022-09-06 RX ORDER — VENLAFAXINE HYDROCHLORIDE 75 MG/1
75 CAPSULE, EXTENDED RELEASE ORAL DAILY
Qty: 90 CAPSULE | Refills: 0 | Status: SHIPPED | OUTPATIENT
Start: 2022-09-06

## 2022-09-06 RX ORDER — LAMOTRIGINE 100 MG/1
100 TABLET ORAL 2 TIMES DAILY
Qty: 180 TABLET | Refills: 0 | Status: SHIPPED | OUTPATIENT
Start: 2022-09-06

## 2022-09-06 RX ORDER — BUSPIRONE HYDROCHLORIDE 7.5 MG/1
7.5 TABLET ORAL 2 TIMES DAILY
Qty: 180 TABLET | Refills: 0 | Status: SHIPPED | OUTPATIENT
Start: 2022-09-06

## 2022-09-06 NOTE — TELEPHONE ENCOUNTER
PC CLAUDINE was left of the unnamed medications request was sent into the pharmacy. She may give the office a call back if clarification of this PC is needed.

## 2022-12-28 ENCOUNTER — VIRTUAL VISIT (OUTPATIENT)
Dept: FAMILY MEDICINE CLINIC | Age: 22
End: 2022-12-28
Payer: COMMERCIAL

## 2022-12-28 DIAGNOSIS — F31.81 BIPOLAR 2 DISORDER (HCC): Primary | ICD-10-CM

## 2022-12-28 DIAGNOSIS — F33.41 RECURRENT MAJOR DEPRESSIVE DISORDER, IN PARTIAL REMISSION (HCC): ICD-10-CM

## 2022-12-28 PROBLEM — F33.9 MAJOR DEPRESSIVE DISORDER, RECURRENT, UNSPECIFIED (HCC): Status: ACTIVE | Noted: 2022-12-28

## 2022-12-28 PROBLEM — F33.1 MAJOR DEPRESSIVE DISORDER, RECURRENT, MODERATE (HCC): Status: ACTIVE | Noted: 2022-12-28

## 2022-12-28 PROBLEM — F33.0 MAJOR DEPRESSIVE DISORDER, RECURRENT, MILD (HCC): Status: ACTIVE | Noted: 2022-12-28

## 2022-12-28 PROCEDURE — 99214 OFFICE O/P EST MOD 30 MIN: CPT | Performed by: NURSE PRACTITIONER

## 2022-12-28 RX ORDER — BUSPIRONE HYDROCHLORIDE 7.5 MG/1
7.5 TABLET ORAL 2 TIMES DAILY
Qty: 180 TABLET | Refills: 0 | Status: SHIPPED | OUTPATIENT
Start: 2022-12-28

## 2022-12-28 RX ORDER — VENLAFAXINE HYDROCHLORIDE 75 MG/1
75 CAPSULE, EXTENDED RELEASE ORAL DAILY
Qty: 90 CAPSULE | Refills: 0 | Status: SHIPPED | OUTPATIENT
Start: 2022-12-28

## 2022-12-28 RX ORDER — METFORMIN HYDROCHLORIDE 500 MG/1
500 TABLET, FILM COATED, EXTENDED RELEASE ORAL
COMMUNITY

## 2022-12-28 RX ORDER — LAMOTRIGINE 100 MG/1
100 TABLET ORAL 2 TIMES DAILY
Qty: 180 TABLET | Refills: 0 | Status: SHIPPED | OUTPATIENT
Start: 2022-12-28

## 2022-12-28 NOTE — PROGRESS NOTES
1. \"Have you been to the ER, urgent care clinic since your last visit? Hospitalized since your last visit? \" No    2. \"Have you seen or consulted any other health care providers outside of the [address] [place] [address] since your last visit? \" No     3. For patients aged 39-70: Has the patient had a colonoscopy / FIT/ Cologuard? NA - based on age      If the patient is female:    4. For patients aged 41-77: Has the patient had a mammogram within the past 2 years? NA - based on age or sex      11. For patients aged 21-65: Has the patient had a pap smear?  Yes - no Care Gap present    Chief Complaint   Patient presents with    Referral Follow Up     Patient needs med refills and referral to psy

## 2022-12-29 NOTE — PROGRESS NOTES
Zulma Mcguire is a 23 y.o. female, evaluated via audio-only technology on 12/28/2022 for Referral Follow Up (Patient needs med refills and referral to psy)  . Historian patient . She endorses her primary psychiatrist has left and replaced several time . She is looking for a new psychiatry group. Bipolar stable at this time requesting refill until she is able to re establish with a new group. Contact information provided for life stance. Assessment & Plan:   Diagnoses and all orders for this visit:    1. Bipolar 2 disorder (HCC)  -     lamoTRIgine (LaMICtal) 100 mg tablet; Take 1 Tablet by mouth two (2) times a day. -     busPIRone (BUSPAR) 7.5 mg tablet; Take 1 Tablet by mouth two (2) times a day. -     venlafaxine-SR (EFFEXOR-XR) 75 mg capsule; Take 1 Capsule by mouth daily. 2. Recurrent major depressive disorder, in partial remission ([facility]      The complexity of medical decision making for this visit is high       12  Subjective:       Prior to Admission medications    Medication Sig Start Date End Date Taking? Authorizing Provider   [facility]) 500 mg TG24 24 hour tablet Take 500 mg by mouth daily (with dinner). Take 2 tablets in the evening   Yes Provider, Historical   lamoTRIgine (LaMICtal) 100 mg tablet Take 1 Tablet by mouth two (2) times a day. 12/28/22  Yes Jesus hTurman NP   busPIRone (BUSPAR) 7.5 mg tablet Take 1 Tablet by mouth two (2) times a day. 12/28/22  Yes Jesus Thurman NP   venlafaxine-SR [facility] 75 mg capsule Take 1 Capsule by mouth daily. 12/28/22  Yes Jesus Thurman NP   melatonin 10 mg TbDi DISSOLVE 1 TABLET BY MOUTH AT BEDTIME 2/3/22  Yes Provider, Historical   cholecalciferol (VITAMIN D3) (2,000 UNITS /50 MCG) cap capsule Take 1 Cap by mouth daily.  4/12/21  Yes Jesus Thurman NP   azithromycin (ZITHROMAX) 250 mg tablet Take 2 tablets today, then take 1 tablet daily  Patient not taking: Reported on 12/28/2022 5/10/22   Jesus Thurman NP   [facility] Perles) 100 mg capsule Take 1 Capsule by mouth three (3) times daily as needed for Cough. Patient not taking: Reported on 12/28/2022 5/10/22   Sukhi Omalley NP     Patient Active Problem List   Diagnosis Code    Bipolar 2 disorder [facility]) F31.81    Major depressive disorder, recurrent, mild F33.0    Major depressive disorder, recurrent, moderate F33.1    Major depressive disorder, recurrent, unspecified F33.9     Patient Active Problem List    Diagnosis Date Noted    Major depressive disorder, recurrent, mild 12/28/2022    Major depressive disorder, recurrent, moderate 12/28/2022    Major depressive disorder, recurrent, unspecified 12/28/2022    Bipolar 2 disorder ([facility] 04/20/2022     Current Outpatient Medications   Medication Sig Dispense Refill    metFORMIN (GLUMETZA ER) 500 mg TG24 24 hour tablet Take 500 mg by mouth daily (with dinner). Take 2 tablets in the evening      lamoTRIgine (LaMICtal) 100 mg tablet Take 1 Tablet by mouth two (2) times a day. 180 Tablet 0    busPIRone (BUSPAR) 7.5 mg tablet Take 1 Tablet by mouth two (2) times a day. 180 Tablet 0    venlafaxine-SR (EFFEXOR-XR) 75 mg capsule Take 1 Capsule by mouth daily. 90 Capsule 0    melatonin 10 mg TbDi DISSOLVE 1 TABLET BY MOUTH AT BEDTIME      cholecalciferol (VITAMIN D3) (2,000 UNITS /50 MCG) cap capsule Take 1 Cap by mouth daily. 90 Cap 1    azithromycin (ZITHROMAX) 250 mg tablet Take 2 tablets today, then take 1 tablet daily (Patient not taking: Reported on 12/28/2022) 6 Tablet 0    benzonatate (Tessalon Perles) 100 mg capsule Take 1 Capsule by mouth three (3) times daily as needed for Cough.  (Patient not taking: Reported on 12/28/2022) 30 Capsule 0     Allergies   Allergen Reactions    Pcn [Penicillins] Shortness of Breath and Rash     Past Medical History:   Diagnosis Date    Bipolar 2 disorder ([facility]     Depression     HTN (hypertension)     PCOS (polycystic ovarian syndrome)      Past Surgical History:   Procedure Laterality Date    HX ADENOIDECTOMY       Family History   Problem Relation Age of Onset    Cancer Mother         breast    Hypertension Father     Other Sister         PCOS    COPD Paternal Grandmother     Cancer Paternal Grandmother         breast    Cancer Maternal Grandmother     Stroke Maternal Grandfather         three    Heart Attack Paternal Grandfather      Social History     Tobacco Use    Smoking status: Never    Smokeless tobacco: Former   Substance Use Topics    Alcohol use: Not Currently     Comment: beer, wine 1 a week       ROS    No data recorded     Leah Cooper Record was evaluated through a patient-initiated, synchronous (real-time) audio only encounter. She (or guardian if applicable) is aware that it is a billable service, which includes applicable co-pays, with coverage as determined by her insurance carrier. This visit was conducted with the patient's (and/or Darrel Grider guardian's) verbal consent. She has not had a related appointment within my department in the past 7 days or scheduled within the next 24 hours. The patient was located in a state where the provider was licensed to provide care. The patient was located at: Home: [address] [place] [phone]  The provider was located at:  Facility (Appt Department): [id number] [place] [address] [address] [id number]    Total Time: minutes: 21-30 minutes    Kristina Dubon NP

## 2023-02-28 ENCOUNTER — HOSPITAL ENCOUNTER (EMERGENCY)
Age: 23
Discharge: HOME | End: 2023-02-28
Attending: EMERGENCY MEDICINE
Payer: COMMERCIAL

## 2023-02-28 VITALS
BODY MASS INDEX: 34.55 KG/M2 | HEIGHT: 66 IN | SYSTOLIC BLOOD PRESSURE: 125 MMHG | HEART RATE: 74 BPM | RESPIRATION RATE: 17 BRPM | TEMPERATURE: 98.9 F | WEIGHT: 215 LBS | DIASTOLIC BLOOD PRESSURE: 79 MMHG | OXYGEN SATURATION: 97 %

## 2023-02-28 DIAGNOSIS — A05.9 FOOD POISONING: Primary | ICD-10-CM

## 2023-02-28 LAB
ALBUMIN SERPL-MCNC: 4.7 G/DL (ref 3.5–5)
ALBUMIN/GLOB SERPL: 1.1 (ref 1.1–2.2)
ALP SERPL-CCNC: 95 U/L (ref 45–117)
ALT SERPL-CCNC: 48 U/L (ref 12–78)
ANION GAP SERPL CALC-SCNC: 11 MMOL/L (ref 5–15)
APPEARANCE UR: CLEAR
AST SERPL-CCNC: 26 U/L (ref 15–37)
BACTERIA URNS QL MICRO: ABNORMAL /HPF
BASOPHILS # BLD: 0.1 K/UL (ref 0–0.1)
BASOPHILS NFR BLD: 1 % (ref 0–1)
BILIRUB SERPL-MCNC: 0.5 MG/DL (ref 0.2–1)
BILIRUB UR QL CFM: NEGATIVE
BUN SERPL-MCNC: 13 MG/DL (ref 6–20)
BUN/CREAT SERPL: 14 (ref 12–20)
CALCIUM SERPL-MCNC: 9.5 MG/DL (ref 8.5–10.1)
CHLORIDE SERPL-SCNC: 104 MMOL/L (ref 97–108)
CO2 SERPL-SCNC: 25 MMOL/L (ref 21–32)
COLOR UR: ABNORMAL
CREAT SERPL-MCNC: 0.95 MG/DL (ref 0.55–1.02)
DIFFERENTIAL METHOD BLD: ABNORMAL
EOSINOPHIL # BLD: 0.1 K/UL (ref 0–0.4)
EOSINOPHIL NFR BLD: 1 % (ref 0–7)
EPITH CASTS URNS QL MICRO: ABNORMAL /LPF
ERYTHROCYTE [DISTWIDTH] IN BLOOD BY AUTOMATED COUNT: 12.9 % (ref 11.5–14.5)
GLOBULIN SER CALC-MCNC: 4.2 G/DL (ref 2–4)
GLUCOSE SERPL-MCNC: 126 MG/DL (ref 65–100)
GLUCOSE UR STRIP.AUTO-MCNC: NEGATIVE MG/DL
HCG SERPL-ACNC: 6 MIU/ML (ref 0–6)
HCT VFR BLD AUTO: 47.6 % (ref 35–47)
HGB BLD-MCNC: 15.7 G/DL (ref 11.5–16)
HGB UR QL STRIP: ABNORMAL
IMM GRANULOCYTES # BLD AUTO: 0.1 K/UL (ref 0–0.04)
IMM GRANULOCYTES NFR BLD AUTO: 0 % (ref 0–0.5)
KETONES UR QL STRIP.AUTO: NEGATIVE MG/DL
LEUKOCYTE ESTERASE UR QL STRIP.AUTO: ABNORMAL
LIPASE SERPL-CCNC: 120 U/L (ref 73–393)
LYMPHOCYTES # BLD: 1.1 K/UL (ref 0.8–3.5)
LYMPHOCYTES NFR BLD: 9 % (ref 12–49)
MCH RBC QN AUTO: 27.2 PG (ref 26–34)
MCHC RBC AUTO-ENTMCNC: 33 G/DL (ref 30–36.5)
MCV RBC AUTO: 82.4 FL (ref 80–99)
MONOCYTES # BLD: 0.6 K/UL (ref 0–1)
MONOCYTES NFR BLD: 5 % (ref 5–13)
NEUTS SEG # BLD: 10.9 K/UL (ref 1.8–8)
NEUTS SEG NFR BLD: 84 % (ref 32–75)
NITRITE UR QL STRIP.AUTO: NEGATIVE
NRBC # BLD: 0 K/UL (ref 0–0.01)
NRBC BLD-RTO: 0 PER 100 WBC
PH UR STRIP: 6 (ref 5–8)
PLATELET # BLD AUTO: 344 K/UL (ref 150–400)
PMV BLD AUTO: 9 FL (ref 8.9–12.9)
POTASSIUM SERPL-SCNC: 4.2 MMOL/L (ref 3.5–5.1)
PROT SERPL-MCNC: 8.9 G/DL (ref 6.4–8.2)
PROT UR STRIP-MCNC: 30 MG/DL
RBC # BLD AUTO: 5.78 M/UL (ref 3.8–5.2)
RBC #/AREA URNS HPF: ABNORMAL /HPF (ref 0–5)
SODIUM SERPL-SCNC: 140 MMOL/L (ref 136–145)
SP GR UR REFRACTOMETRY: 1.02 (ref 1–1.03)
UROBILINOGEN UR QL STRIP.AUTO: 0.2 EU/DL (ref 0.2–1)
WBC # BLD AUTO: 12.8 K/UL (ref 3.6–11)
WBC URNS QL MICRO: ABNORMAL /HPF (ref 0–4)

## 2023-02-28 PROCEDURE — 85025 COMPLETE CBC W/AUTO DIFF WBC: CPT

## 2023-02-28 PROCEDURE — 81001 URINALYSIS AUTO W/SCOPE: CPT

## 2023-02-28 PROCEDURE — 99284 EMERGENCY DEPT VISIT MOD MDM: CPT

## 2023-02-28 PROCEDURE — 84702 CHORIONIC GONADOTROPIN TEST: CPT

## 2023-02-28 PROCEDURE — 36415 COLL VENOUS BLD VENIPUNCTURE: CPT

## 2023-02-28 PROCEDURE — 96374 THER/PROPH/DIAG INJ IV PUSH: CPT

## 2023-02-28 PROCEDURE — 74011250636 HC RX REV CODE- 250/636: Performed by: EMERGENCY MEDICINE

## 2023-02-28 PROCEDURE — 83690 ASSAY OF LIPASE: CPT

## 2023-02-28 PROCEDURE — 80053 COMPREHEN METABOLIC PANEL: CPT

## 2023-02-28 PROCEDURE — 96361 HYDRATE IV INFUSION ADD-ON: CPT

## 2023-02-28 RX ORDER — ONDANSETRON 4 MG/1
4 TABLET, ORALLY DISINTEGRATING ORAL
Qty: 10 TABLET | Refills: 0 | Status: SHIPPED | OUTPATIENT
Start: 2023-02-28

## 2023-02-28 RX ORDER — DICYCLOMINE HYDROCHLORIDE 20 MG/1
20 TABLET ORAL
Qty: 20 TABLET | Refills: 0 | Status: SHIPPED | OUTPATIENT
Start: 2023-02-28 | End: 2023-03-05

## 2023-02-28 RX ORDER — ONDANSETRON 2 MG/ML
4 INJECTION INTRAMUSCULAR; INTRAVENOUS ONCE
Status: COMPLETED | OUTPATIENT
Start: 2023-02-28 | End: 2023-02-28

## 2023-02-28 RX ADMIN — ONDANSETRON 4 MG: 2 INJECTION INTRAMUSCULAR; INTRAVENOUS at 08:24

## 2023-02-28 RX ADMIN — SODIUM CHLORIDE 1000 ML: 9 INJECTION, SOLUTION INTRAVENOUS at 08:24

## 2023-02-28 NOTE — ED TRIAGE NOTES
Pt arrived by POV for N/V/D. Pt reports around 0100 she developed N/V/D and is not able to keep anything down.   Pt is awake alert and oriented X 4, pt educated on ER flow

## 2023-02-28 NOTE — ED PROVIDER NOTES
EMERGENCY DEPARTMENT HISTORY AND PHYSICAL EXAM  RUSLAN Velez MD            Date: 2/28/2023  Patient Name: Adam Oconnor    History of Presenting Illness     Chief Complaint   Patient presents with    Abdominal Pain     N/V/D       History Provided By: Patient and Patient's     HPI: Adam Oconnor is a 23 y.o. female, pmhx pork and lactose intolerance, polycystic ovarian syndrome hypertension, bipolar 2, who presents by private car to the ED for evaluation of nausea, vomiting and diarrhea. Patient states that she is lactose intolerant, but has tolerated small amounts of cheese in the past, but had Chipotle with cheese yesterday around noon, at midnight last night began having nausea and vomiting that continued through the night with several episodes of diarrhea. Patient denies any significant abdominal pain, flank pain and has had no urinary symptoms. Patient has had some hot flashes and chills, but no documented fever. Patient specifically denies any recent fevers, abd pain, CP, SOB, urinary sxs, or headache. PCP: Kristofer Hines NP        Current Outpatient Medications   Medication Sig Dispense Refill    ondansetron (ZOFRAN ODT) 4 mg disintegrating tablet Take 1 Tablet by mouth every eight (8) hours as needed for Nausea or Vomiting. 10 Tablet 0    dicyclomine (BENTYL) 20 mg tablet Take 1 Tablet by mouth every six to eight (6-8) hours as needed for Abdominal Cramps for up to 5 days. 20 Tablet 0    metFORMIN (GLUMETZA ER) 500 mg TG24 24 hour tablet Take 500 mg by mouth daily (with dinner). Take 2 tablets in the evening      lamoTRIgine (LaMICtal) 100 mg tablet Take 1 Tablet by mouth two (2) times a day. 180 Tablet 0    busPIRone (BUSPAR) 7.5 mg tablet Take 1 Tablet by mouth two (2) times a day. 180 Tablet 0    venlafaxine-SR (EFFEXOR-XR) 75 mg capsule Take 1 Capsule by mouth daily.  90 Capsule 0    azithromycin (ZITHROMAX) 250 mg tablet Take 2 tablets today, then take 1 tablet daily (Patient not taking: Reported on 12/28/2022) 6 Tablet 0    benzonatate (Tessalon Perles) 100 mg capsule Take 1 Capsule by mouth three (3) times daily as needed for Cough. (Patient not taking: Reported on 12/28/2022) 30 Capsule 0    melatonin 10 mg TbDi DISSOLVE 1 TABLET BY MOUTH AT BEDTIME      cholecalciferol (VITAMIN D3) (2,000 UNITS /50 MCG) cap capsule Take 1 Cap by mouth daily. 80 Cap 1       Past History     Past Medical History:  Past Medical History:   Diagnosis Date    Bipolar 2 disorder ([address]     Depression     HTN (hypertension)     PCOS (polycystic ovarian syndrome)        Past Surgical History:  Past Surgical History:   Procedure Laterality Date    HX ADENOIDECTOMY         Family History:  Family History   Problem Relation Age of Onset    Cancer Mother         breast    Hypertension Father     Other Sister         PCOS    COPD Paternal Grandmother     Cancer Paternal Grandmother         breast    Cancer Maternal Grandmother     Stroke Maternal Grandfather         three    Heart Attack Paternal Grandfather        Social History:  Social History     Tobacco Use    Smoking status: Never    Smokeless tobacco: Former   Vaping Use    Vaping Use: Never used   Substance Use Topics    Alcohol use: Not Currently     Comment: beer, wine 1 a week    Drug use: Never     Social Hx: No tobacco, No vaping, No EtOH    Allergies: Allergies   Allergen Reactions    Pcn [Penicillins] Shortness of Breath and Rash         Review of Systems   Review of Systems   Constitutional:  Positive for activity change, appetite change, chills, diaphoresis and fatigue. Negative for fever. Gastrointestinal:  Positive for diarrhea, nausea and vomiting. Negative for abdominal pain. Genitourinary:  Negative for dysuria. Physical Exam   Physical Exam  Vitals and nursing note reviewed. Constitutional:       Appearance: Normal appearance. HENT:      Head: Normocephalic.       Right Ear: External ear normal.      Left Ear: External ear normal. Eyes:      Pupils: Pupils are equal, round, and reactive to light. Cardiovascular:      Rate and Rhythm: Normal rate and regular rhythm. Pulmonary:      Effort: Pulmonary effort is normal.      Breath sounds: Normal breath sounds. Abdominal:      General: There is no distension. Palpations: Abdomen is soft. Tenderness: There is no abdominal tenderness. There is no right CVA tenderness, left CVA tenderness, guarding or rebound. Negative signs include Alas's sign and McBurney's sign. Musculoskeletal:         General: Normal range of motion. Cervical back: Normal range of motion and neck supple. Skin:     General: Skin is warm and dry. Neurological:      General: No focal deficit present. Mental Status: She is alert and oriented to person, place, and time. Psychiatric:         Mood and Affect: Mood normal.         Behavior: Behavior normal.       Diagnostic Study Results     Labs -     Recent Results (from the past 80 hour(s))   CBC WITH AUTOMATED DIFF    Collection Time: 02/28/23  8:22 AM   Result Value Ref Range    WBC 12.8 (H) 3.6 - 11.0 K/uL    RBC 5.78 (H) 3.80 - 5.20 M/uL    HGB 15.7 11.5 - 16.0 g/dL    HCT 47.6 (H) 35.0 - 47.0 %    MCV 82.4 80.0 - 99.0 FL    MCH 27.2 26.0 - 34.0 PG    MCHC 33.0 30.0 - 36.5 g/dL    RDW 12.9 11.5 - 14.5 %    PLATELET 513 607 - 096 K/uL    MPV 9.0 8.9 - 12.9 FL    NRBC 0.0 0  WBC    ABSOLUTE NRBC 0.00 0.00 - 0.01 K/uL    NEUTROPHILS 84 (H) 32 - 75 %    LYMPHOCYTES 9 (L) 12 - 49 %    MONOCYTES 5 5 - 13 %    EOSINOPHILS 1 0 - 7 %    BASOPHILS 1 0 - 1 %    IMMATURE GRANULOCYTES 0 0.0 - 0.5 %    ABS. NEUTROPHILS 10.9 (H) 1.8 - 8.0 K/UL    ABS. LYMPHOCYTES 1.1 0.8 - 3.5 K/UL    ABS. MONOCYTES 0.6 0.0 - 1.0 K/UL    ABS. EOSINOPHILS 0.1 0.0 - 0.4 K/UL    ABS. BASOPHILS 0.1 0.0 - 0.1 K/UL    ABS. IMM.  GRANS. 0.1 (H) 0.00 - 0.04 K/UL    DF AUTOMATED     METABOLIC PANEL, COMPREHENSIVE    Collection Time: 02/28/23  8:22 AM   Result Value Ref Range Sodium 140 136 - 145 mmol/L    Potassium 4.2 3.5 - 5.1 mmol/L    Chloride 104 97 - 108 mmol/L    CO2 25 21 - 32 mmol/L    Anion gap 11 5 - 15 mmol/L    Glucose 126 (H) 65 - 100 mg/dL    BUN 13 6 - 20 MG/DL    Creatinine 0.95 0.55 - 1.02 MG/DL    BUN/Creatinine ratio 14 12 - 20      eGFR >60 >60 ml/min/1.73m2    Calcium 9.5 8.5 - 10.1 MG/DL    Bilirubin, total 0.5 0.2 - 1.0 MG/DL    ALT (SGPT) 48 12 - 78 U/L    AST (SGOT) 26 15 - 37 U/L    Alk. phosphatase 95 45 - 117 U/L    Protein, total 8.9 (H) 6.4 - 8.2 g/dL    Albumin 4.7 3.5 - 5.0 g/dL    Globulin 4.2 (H) 2.0 - 4.0 g/dL    A-G Ratio 1.1 1.1 - 2.2     LIPASE    Collection Time: 02/28/23  8:22 AM   Result Value Ref Range    Lipase 120 73 - 393 U/L   BETA HCG, QT    Collection Time: 02/28/23  8:22 AM   Result Value Ref Range    Beta HCG, QT 6 0 - 6 MIU/ML   URINALYSIS W/ RFLX MICROSCOPIC    Collection Time: 02/28/23  9:10 AM   Result Value Ref Range    Color YELLOW/STRAW      Appearance CLEAR CLEAR      Specific gravity 1.025 1.003 - 1.030      pH (UA) 6.0 5.0 - 8.0      Protein 30 (A) NEG mg/dL    Glucose Negative NEG mg/dL    Ketone Negative NEG mg/dL    Blood TRACE (A) NEG      Urobilinogen 0.2 0.2 - 1.0 EU/dL    Nitrites Negative NEG      Leukocyte Esterase TRACE (A) NEG     BILIRUBIN, CONFIRM    Collection Time: 02/28/23  9:10 AM   Result Value Ref Range    Bilirubin UA, confirm Negative NEG     URINE MICROSCOPIC ONLY    Collection Time: 02/28/23  9:10 AM   Result Value Ref Range    WBC 5-10 0 - 4 /hpf    RBC 5-10 0 - 5 /hpf    Epithelial cells MODERATE (A) FEW /lpf    Bacteria 1+ (A) NEG /hpf         Radiologic Studies -   No orders to display     CT Results  (Last 48 hours)      None          CXR Results  (Last 48 hours)      None              Medical Decision Making   I am the first provider for this patient. I reviewed the vital signs, available nursing notes, past medical history, past surgical history, family history and social history.     Vital Signs-Reviewed the patient's vital signs. No data found. Records Reviewed: Prior medical records and Nursing notes    Provider Notes (Medical Decision Making):   MDM:   History-from patient,     Record Review -previous medical records    Results -patient has slightly elevated white blood cell count consistent with her vomiting, gastroenteritis. Patient has no abdominal tenderness suggestive of cholecystitis, appendicitis or pelvic inflammation. Patient has normal chemistries, no evidence of severe dehydration or electrolyte imbalance. Patient has no hyperglycemia consistent with diabetes. Patient has no significant renal change suggestive of severe dehydration. Patient's liver functions and lipase are normal suggestive of no cholecystitis or pancreatitis. Patient's pregnancy test is negative, no pregnancy complications suspected. Work-up considered -no suggestion of small bowel obstruction or focal abdominal process requiring an abdominal CT at this time. Treatment and Medications (+considered) -patient feels better with fluids, antiemetics. Patient given instructions on careful diet, Bentyl and Zofran to help with symptoms. Differential -patient has symptoms and signs suggestive of food reaction, food poisoning, lactose intolerance or food intolerance. There is no evidence of acute cholecystitis, pancreatitis, urinary tract infection, pyelonephritis, ureteral calculi, small bowel obstruction, pregnancy or pregnancy complication. ED Course:   Initial assessment performed. The patients presenting problems have been discussed, and they are in agreement with the care plan formulated and outlined with them. I have encouraged them to ask questions as they arise throughout their visit. Discharge note:    Pt re-evaluated and noted to be feeling better , ready for discharge. Updated pt and  on all final lab  findings. Will follow up as instructed .  All questions have been answered, pt voiced understanding and agreement with plan. If pt prescribed prescription, ashley narcotics or antibiotics, specific precautions given  Specific return precautions provided as well as instructions to return to the ED should sx worsen at any time. Vital signs stable for discharge. Critical Care Time:   0 min. Diagnosis     Clinical Impression:   1. Food poisoning        PLAN:  1. Discharge Medication List as of 2/28/2023  9:51 AM        START taking these medications    Details   ondansetron (ZOFRAN ODT) 4 mg disintegrating tablet Take 1 Tablet by mouth every eight (8) hours as needed for Nausea or Vomiting., Normal, Disp-10 Tablet, R-0      dicyclomine (BENTYL) 20 mg tablet Take 1 Tablet by mouth every six to eight (6-8) hours as needed for Abdominal Cramps for up to 5 days. , Normal, Disp-20 Tablet, R-0           CONTINUE these medications which have NOT CHANGED    Details   metFORMIN (GLUMETZA ER) 500 mg TG24 24 hour tablet Take 500 mg by mouth daily (with dinner). Take 2 tablets in the evening, Historical Med      lamoTRIgine (LaMICtal) 100 mg tablet Take 1 Tablet by mouth two (2) times a day., Normal, Disp-180 Tablet, R-0      busPIRone (BUSPAR) 7.5 mg tablet Take 1 Tablet by mouth two (2) times a day., Normal, Disp-180 Tablet, R-0      venlafaxine-SR (EFFEXOR-XR) 75 mg capsule Take 1 Capsule by mouth daily. , Normal, Disp-90 Capsule, R-0      azithromycin (ZITHROMAX) 250 mg tablet Take 2 tablets today, then take 1 tablet daily, Normal, Disp-6 Tablet, R-0      benzonatate (Tessalon Perles) 100 mg capsule Take 1 Capsule by mouth three (3) times daily as needed for Cough., Normal, Disp-30 Capsule, R-0      melatonin 10 mg TbDi DISSOLVE 1 TABLET BY MOUTH AT BEDTIME, Historical Med      cholecalciferol (VITAMIN D3) (2,000 UNITS /50 MCG) cap capsule Take 1 Cap by mouth daily. , Normal, Disp-90 Cap, R-1           2.    Follow-up Information       Follow up With Specialties Details Why Contact Will Nixon NP Nurse Practitioner In 2 days For recheck and adjusting medications as needed [address]  [phone]            Return to ED if worse     Disposition:  Home       Please note, this dictation was completed with [employer], the computer voice recognition software. Quite often unanticipated grammatical, syntax, homophones, and other interpretive errors are inadvertently transcribed by the computer software. Please disregard these errors. Please excuse any errors that have escaped final proof reading.

## 2023-02-28 NOTE — DISCHARGE INSTRUCTIONS
When you are recovering from vomiting and or diarrhea it is important to slowly advance your diet. At first start with clear fluids such as Powerade, Gatorade, other sports drinks, herbal teas, watered-down juices or teas to rehydrate yourself. As you tolerate fluids, you can slowly advance your diet including simple carbohydrates such as toast, crackers, noodles soup, broth. For protein use chicken broth or beef broth and when feeling better you can try scrambled eggs with no oil or butter. Avoid foods with high oils such as pizza, fried foods, hamburgers, hot dogs until feeling completely better. Avoid spicy foods and avoid acid such as tomato juice, orange juice or pineapple juice, avoid coffee or chocolate that can cause abdominal cramping.

## 2023-05-29 RX ORDER — LAMOTRIGINE 100 MG/1
100 TABLET ORAL 2 TIMES DAILY
COMMUNITY
Start: 2022-12-28

## 2023-05-29 RX ORDER — CALCIUM CARBONATE 300MG(750)
TABLET,CHEWABLE ORAL
COMMUNITY
Start: 2022-02-03

## 2023-05-29 RX ORDER — METFORMIN HYDROCHLORIDE 500 MG/1
500 TABLET, FILM COATED, EXTENDED RELEASE ORAL
COMMUNITY

## 2023-05-29 RX ORDER — BENZONATATE 100 MG/1
100 CAPSULE ORAL 3 TIMES DAILY PRN
COMMUNITY
Start: 2022-05-10

## 2023-05-29 RX ORDER — ONDANSETRON 4 MG/1
4 TABLET, ORALLY DISINTEGRATING ORAL EVERY 8 HOURS PRN
COMMUNITY
Start: 2023-02-28

## 2023-05-29 RX ORDER — BUSPIRONE HYDROCHLORIDE 7.5 MG/1
7.5 TABLET ORAL 2 TIMES DAILY
COMMUNITY
Start: 2022-12-28

## 2023-05-29 RX ORDER — AZITHROMYCIN 250 MG/1
TABLET, FILM COATED ORAL
COMMUNITY
Start: 2022-05-10

## 2023-05-29 RX ORDER — VENLAFAXINE HYDROCHLORIDE 75 MG/1
75 CAPSULE, EXTENDED RELEASE ORAL DAILY
COMMUNITY
Start: 2022-12-28